# Patient Record
Sex: FEMALE | Race: OTHER | Employment: OTHER | URBAN - METROPOLITAN AREA
[De-identification: names, ages, dates, MRNs, and addresses within clinical notes are randomized per-mention and may not be internally consistent; named-entity substitution may affect disease eponyms.]

---

## 2020-12-16 ENCOUNTER — HOSPITAL ENCOUNTER (EMERGENCY)
Age: 40
Discharge: HOME | End: 2020-12-16
Attending: EMERGENCY MEDICINE
Payer: OTHER GOVERNMENT

## 2020-12-16 ENCOUNTER — APPOINTMENT (OUTPATIENT)
Dept: CT IMAGING | Age: 40
End: 2020-12-16
Attending: PHYSICIAN ASSISTANT
Payer: OTHER GOVERNMENT

## 2020-12-16 ENCOUNTER — APPOINTMENT (OUTPATIENT)
Dept: GENERAL RADIOLOGY | Age: 40
End: 2020-12-16
Attending: PHYSICIAN ASSISTANT
Payer: OTHER GOVERNMENT

## 2020-12-16 VITALS
DIASTOLIC BLOOD PRESSURE: 63 MMHG | OXYGEN SATURATION: 100 % | RESPIRATION RATE: 13 BRPM | TEMPERATURE: 98 F | HEIGHT: 66 IN | HEART RATE: 67 BPM | SYSTOLIC BLOOD PRESSURE: 128 MMHG | WEIGHT: 19 LBS | BODY MASS INDEX: 3.05 KG/M2

## 2020-12-16 DIAGNOSIS — G43.909 MIGRAINE WITHOUT STATUS MIGRAINOSUS, NOT INTRACTABLE, UNSPECIFIED MIGRAINE TYPE: Primary | ICD-10-CM

## 2020-12-16 DIAGNOSIS — M25.512 ACUTE PAIN OF LEFT SHOULDER: ICD-10-CM

## 2020-12-16 LAB
ALBUMIN SERPL-MCNC: 3.9 G/DL (ref 3.4–5)
ALBUMIN/GLOB SERPL: 1 {RATIO} (ref 0.8–1.7)
ALP SERPL-CCNC: 67 U/L (ref 45–117)
ALT SERPL-CCNC: 33 U/L (ref 13–56)
ANION GAP SERPL CALC-SCNC: 3 MMOL/L (ref 3–18)
APPEARANCE UR: CLEAR
APTT PPP: 27.6 SEC (ref 23–36.4)
AST SERPL-CCNC: 23 U/L (ref 10–38)
BASOPHILS # BLD: 0 K/UL (ref 0–0.1)
BASOPHILS NFR BLD: 0 % (ref 0–2)
BILIRUB SERPL-MCNC: 0.4 MG/DL (ref 0.2–1)
BILIRUB UR QL: NEGATIVE
BUN SERPL-MCNC: 6 MG/DL (ref 7–18)
BUN/CREAT SERPL: 7 (ref 12–20)
CALCIUM SERPL-MCNC: 9.1 MG/DL (ref 8.5–10.1)
CHLORIDE SERPL-SCNC: 106 MMOL/L (ref 100–111)
CK MB CFR SERPL CALC: 2.1 % (ref 0–4)
CK MB SERPL-MCNC: 2.7 NG/ML (ref 5–25)
CK SERPL-CCNC: 128 U/L (ref 26–192)
CO2 SERPL-SCNC: 29 MMOL/L (ref 21–32)
COLOR UR: YELLOW
CREAT SERPL-MCNC: 0.81 MG/DL (ref 0.6–1.3)
DIFFERENTIAL METHOD BLD: NORMAL
EOSINOPHIL # BLD: 0.1 K/UL (ref 0–0.4)
EOSINOPHIL NFR BLD: 2 % (ref 0–5)
ERYTHROCYTE [DISTWIDTH] IN BLOOD BY AUTOMATED COUNT: 13.3 % (ref 11.6–14.5)
GLOBULIN SER CALC-MCNC: 3.8 G/DL (ref 2–4)
GLUCOSE SERPL-MCNC: 93 MG/DL (ref 74–99)
GLUCOSE UR STRIP.AUTO-MCNC: NEGATIVE MG/DL
HCG UR QL: NEGATIVE
HCT VFR BLD AUTO: 42 % (ref 35–45)
HGB BLD-MCNC: 14.3 G/DL (ref 12–16)
HGB UR QL STRIP: NEGATIVE
INR PPP: 1 (ref 0.8–1.2)
KETONES UR QL STRIP.AUTO: ABNORMAL MG/DL
LEUKOCYTE ESTERASE UR QL STRIP.AUTO: NEGATIVE
LYMPHOCYTES # BLD: 2.9 K/UL (ref 0.9–3.6)
LYMPHOCYTES NFR BLD: 33 % (ref 21–52)
MCH RBC QN AUTO: 27.9 PG (ref 24–34)
MCHC RBC AUTO-ENTMCNC: 34 G/DL (ref 31–37)
MCV RBC AUTO: 82 FL (ref 74–97)
MONOCYTES # BLD: 0.6 K/UL (ref 0.05–1.2)
MONOCYTES NFR BLD: 7 % (ref 3–10)
NEUTS SEG # BLD: 5.1 K/UL (ref 1.8–8)
NEUTS SEG NFR BLD: 58 % (ref 40–73)
NITRITE UR QL STRIP.AUTO: NEGATIVE
PH UR STRIP: 5 [PH] (ref 5–8)
PLATELET # BLD AUTO: 295 K/UL (ref 135–420)
PMV BLD AUTO: 10.2 FL (ref 9.2–11.8)
POTASSIUM SERPL-SCNC: 3.8 MMOL/L (ref 3.5–5.5)
PROT SERPL-MCNC: 7.7 G/DL (ref 6.4–8.2)
PROT UR STRIP-MCNC: NEGATIVE MG/DL
PROTHROMBIN TIME: 13.3 SEC (ref 11.5–15.2)
RBC # BLD AUTO: 5.12 M/UL (ref 4.2–5.3)
SODIUM SERPL-SCNC: 138 MMOL/L (ref 136–145)
SP GR UR REFRACTOMETRY: 1.02 (ref 1–1.03)
TROPONIN I SERPL-MCNC: <0.02 NG/ML (ref 0–0.04)
UROBILINOGEN UR QL STRIP.AUTO: 1 EU/DL (ref 0.2–1)
WBC # BLD AUTO: 8.8 K/UL (ref 4.6–13.2)

## 2020-12-16 PROCEDURE — 85610 PROTHROMBIN TIME: CPT

## 2020-12-16 PROCEDURE — 74011000258 HC RX REV CODE- 258: Performed by: PHYSICIAN ASSISTANT

## 2020-12-16 PROCEDURE — 85025 COMPLETE CBC W/AUTO DIFF WBC: CPT

## 2020-12-16 PROCEDURE — 74011250637 HC RX REV CODE- 250/637: Performed by: PHYSICIAN ASSISTANT

## 2020-12-16 PROCEDURE — 96375 TX/PRO/DX INJ NEW DRUG ADDON: CPT

## 2020-12-16 PROCEDURE — 70450 CT HEAD/BRAIN W/O DYE: CPT

## 2020-12-16 PROCEDURE — 80053 COMPREHEN METABOLIC PANEL: CPT

## 2020-12-16 PROCEDURE — 81025 URINE PREGNANCY TEST: CPT

## 2020-12-16 PROCEDURE — 82550 ASSAY OF CK (CPK): CPT

## 2020-12-16 PROCEDURE — 85730 THROMBOPLASTIN TIME PARTIAL: CPT

## 2020-12-16 PROCEDURE — 74011250636 HC RX REV CODE- 250/636: Performed by: PHYSICIAN ASSISTANT

## 2020-12-16 PROCEDURE — 73030 X-RAY EXAM OF SHOULDER: CPT

## 2020-12-16 PROCEDURE — 81003 URINALYSIS AUTO W/O SCOPE: CPT

## 2020-12-16 PROCEDURE — 96365 THER/PROPH/DIAG IV INF INIT: CPT

## 2020-12-16 PROCEDURE — 71046 X-RAY EXAM CHEST 2 VIEWS: CPT

## 2020-12-16 PROCEDURE — 99285 EMERGENCY DEPT VISIT HI MDM: CPT

## 2020-12-16 RX ORDER — BUTALBITAL, ACETAMINOPHEN AND CAFFEINE 300; 40; 50 MG/1; MG/1; MG/1
1 CAPSULE ORAL
Qty: 10 CAP | Refills: 0 | Status: SHIPPED | OUTPATIENT
Start: 2020-12-16

## 2020-12-16 RX ORDER — IBUPROFEN 400 MG/1
800 TABLET ORAL
Status: COMPLETED | OUTPATIENT
Start: 2020-12-16 | End: 2020-12-16

## 2020-12-16 RX ORDER — DIPHENHYDRAMINE HYDROCHLORIDE 50 MG/ML
25 INJECTION, SOLUTION INTRAMUSCULAR; INTRAVENOUS
Status: COMPLETED | OUTPATIENT
Start: 2020-12-16 | End: 2020-12-16

## 2020-12-16 RX ADMIN — DIPHENHYDRAMINE HYDROCHLORIDE 25 MG: 50 INJECTION, SOLUTION INTRAMUSCULAR; INTRAVENOUS at 16:12

## 2020-12-16 RX ADMIN — IBUPROFEN 800 MG: 400 TABLET, FILM COATED ORAL at 17:38

## 2020-12-16 RX ADMIN — PROMETHAZINE HYDROCHLORIDE 25 MG: 25 INJECTION INTRAMUSCULAR; INTRAVENOUS at 16:59

## 2020-12-16 RX ADMIN — SODIUM CHLORIDE 1000 ML: 900 INJECTION, SOLUTION INTRAVENOUS at 16:12

## 2020-12-16 NOTE — ED PROVIDER NOTES
EMERGENCY DEPARTMENT HISTORY AND PHYSICAL EXAM    Date: 12/16/2020  Patient Name: Lana Reed    History of Presenting Illness     Chief Complaint   Patient presents with    Headache         History Provided By: Patient    Chief Complaint: headache       Additional History (Context):   3:46 PM  Lana Reed is a 38 y.o. female with PMHX grains, TIA last year presents to the emergency department C/O headache with blurred vision bilaterally that she woke up with this morning. Patient states this headache does feel similar to migraine she has had in the past but she has never had vision change with that. States the TIA she had last year she experienced a headache and vision change and the left eye. She is also complaining of right shoulder pain has been going on for 4 days. Initially she woke up and had some pain and numbness going down the arm 4 days ago. This only lasted a couple hours and has not returned since that time but she has had persistent pain to the shoulder. No injury. No increased activity. She denies chest pain or shortness of breath. No neck pain. PCP: Other, MD Petey        Past History     Past Medical History:  Past Medical History:   Diagnosis Date    TIA (transient ischemic attack)        Past Surgical History:  History reviewed. No pertinent surgical history. Family History:  History reviewed. No pertinent family history. Social History:  Social History     Tobacco Use    Smoking status: Never Smoker    Smokeless tobacco: Never Used   Substance Use Topics    Alcohol use: Not Currently    Drug use: Not Currently       Allergies: Allergies   Allergen Reactions    Celebrex [Celecoxib] Anaphylaxis       Review of Systems   Review of Systems   Constitutional: Negative for chills and fever. HENT: Negative for congestion, ear discharge, ear pain, facial swelling, rhinorrhea, sinus pressure, sinus pain and sore throat.     Eyes: Positive for visual disturbance (bilateral ). Negative for photophobia and pain. Respiratory: Negative for shortness of breath. Cardiovascular: Negative for chest pain. Gastrointestinal: Negative for abdominal pain, diarrhea, nausea and vomiting. Musculoskeletal: Negative for neck pain and neck stiffness. Arthralgias: left arm pain    Skin: Negative for rash. Neurological: Positive for headaches. Negative for dizziness, facial asymmetry, speech difficulty, weakness, light-headedness and numbness. All other systems reviewed and are negative. Physical Exam     Vitals:    12/16/20 1715 12/16/20 1730 12/16/20 1745 12/16/20 1913   BP: 138/76 (!) 141/81 (!) 145/78 128/63   Pulse: 60 (!) 53 (!) 57 67   Resp: 13 11 14 13   Temp:       SpO2: 100% 100% 100% 100%   Weight:       Height:         Physical Exam  Vitals signs and nursing note reviewed. Constitutional:       Appearance: She is well-developed. Comments: Alert, non toxic, NAD    HENT:      Head: Normocephalic and atraumatic. Jaw: No trismus. Right Ear: Tympanic membrane, ear canal and external ear normal. No mastoid tenderness. No hemotympanum. Tympanic membrane is not perforated, erythematous, retracted or bulging. Left Ear: Tympanic membrane, ear canal and external ear normal. No mastoid tenderness. No hemotympanum. Tympanic membrane is not perforated, erythematous, retracted or bulging. Nose: Nose normal.      Mouth/Throat:      Pharynx: Uvula midline. No oropharyngeal exudate, posterior oropharyngeal erythema or uvula swelling. Tonsils: No tonsillar abscesses. Eyes:      Extraocular Movements: Extraocular movements intact. Pupils: Pupils are equal, round, and reactive to light. Neck:      Musculoskeletal: Normal range of motion and neck supple. Normal range of motion. No neck rigidity, spinous process tenderness or muscular tenderness. Meningeal: Brudzinski's sign and Kernig's sign absent.       Comments: No meningismus   No lymphadenopathy Cardiovascular:      Rate and Rhythm: Normal rate and regular rhythm. Heart sounds: Normal heart sounds. No murmur. Pulmonary:      Effort: Pulmonary effort is normal. No respiratory distress. Breath sounds: Normal breath sounds. No wheezing or rales. Abdominal:      General: Bowel sounds are normal.      Palpations: Abdomen is soft. Tenderness: There is no abdominal tenderness. Musculoskeletal: Normal range of motion. Comments: Left shoulder: Minimal tenderness to palpation to the posterior aspect, no deformity or swelling, full range of motion, remainder of arm nontender, radial pulse 2+,  5 out of 5, sensation intact distally   Skin:     General: Skin is warm and dry. Neurological:      Mental Status: She is alert and oriented to person, place, and time. GCS: GCS eye subscore is 4. GCS verbal subscore is 5. GCS motor subscore is 6. Cranial Nerves: No cranial nerve deficit. Sensory: No sensory deficit. Motor: No tremor, atrophy or abnormal muscle tone.       Coordination: Coordination normal.      Gait: Gait normal.      Comments: No neuro deficit   No pronator drift  Normal finger nose finger  N/V intact distally   Strength 5/5 and equal in all extremities   No slurred speech   No facial droop    Psychiatric:         Judgment: Judgment normal.           Diagnostic Study Results     Labs:     Recent Results (from the past 12 hour(s))   URINALYSIS W/ RFLX MICROSCOPIC    Collection Time: 12/16/20  3:45 PM   Result Value Ref Range    Color YELLOW      Appearance CLEAR      Specific gravity 1.016 1.005 - 1.030      pH (UA) 5.0 5.0 - 8.0      Protein Negative NEG mg/dL    Glucose Negative NEG mg/dL    Ketone TRACE (A) NEG mg/dL    Bilirubin Negative NEG      Blood Negative NEG      Urobilinogen 1.0 0.2 - 1.0 EU/dL    Nitrites Negative NEG      Leukocyte Esterase Negative NEG     CBC WITH AUTOMATED DIFF    Collection Time: 12/16/20  3:55 PM   Result Value Ref Range WBC 8.8 4.6 - 13.2 K/uL    RBC 5.12 4.20 - 5.30 M/uL    HGB 14.3 12.0 - 16.0 g/dL    HCT 42.0 35.0 - 45.0 %    MCV 82.0 74.0 - 97.0 FL    MCH 27.9 24.0 - 34.0 PG    MCHC 34.0 31.0 - 37.0 g/dL    RDW 13.3 11.6 - 14.5 %    PLATELET 080 775 - 145 K/uL    MPV 10.2 9.2 - 11.8 FL    NEUTROPHILS 58 40 - 73 %    LYMPHOCYTES 33 21 - 52 %    MONOCYTES 7 3 - 10 %    EOSINOPHILS 2 0 - 5 %    BASOPHILS 0 0 - 2 %    ABS. NEUTROPHILS 5.1 1.8 - 8.0 K/UL    ABS. LYMPHOCYTES 2.9 0.9 - 3.6 K/UL    ABS. MONOCYTES 0.6 0.05 - 1.2 K/UL    ABS. EOSINOPHILS 0.1 0.0 - 0.4 K/UL    ABS. BASOPHILS 0.0 0.0 - 0.1 K/UL    DF AUTOMATED     METABOLIC PANEL, COMPREHENSIVE    Collection Time: 12/16/20  3:55 PM   Result Value Ref Range    Sodium 138 136 - 145 mmol/L    Potassium 3.8 3.5 - 5.5 mmol/L    Chloride 106 100 - 111 mmol/L    CO2 29 21 - 32 mmol/L    Anion gap 3 3.0 - 18 mmol/L    Glucose 93 74 - 99 mg/dL    BUN 6 (L) 7.0 - 18 MG/DL    Creatinine 0.81 0.6 - 1.3 MG/DL    BUN/Creatinine ratio 7 (L) 12 - 20      GFR est AA >60 >60 ml/min/1.73m2    GFR est non-AA >60 >60 ml/min/1.73m2    Calcium 9.1 8.5 - 10.1 MG/DL    Bilirubin, total 0.4 0.2 - 1.0 MG/DL    ALT (SGPT) 33 13 - 56 U/L    AST (SGOT) 23 10 - 38 U/L    Alk.  phosphatase 67 45 - 117 U/L    Protein, total 7.7 6.4 - 8.2 g/dL    Albumin 3.9 3.4 - 5.0 g/dL    Globulin 3.8 2.0 - 4.0 g/dL    A-G Ratio 1.0 0.8 - 1.7     CARDIAC PANEL,(CK, CKMB & TROPONIN)    Collection Time: 12/16/20  3:55 PM   Result Value Ref Range    CK - MB 2.7 <3.6 ng/ml    CK-MB Index 2.1 0.0 - 4.0 %     26 - 192 U/L    Troponin-I, QT <0.02 0.0 - 0.045 NG/ML   PROTHROMBIN TIME + INR    Collection Time: 12/16/20  3:55 PM   Result Value Ref Range    Prothrombin time 13.3 11.5 - 15.2 sec    INR 1.0 0.8 - 1.2     PTT    Collection Time: 12/16/20  3:55 PM   Result Value Ref Range    aPTT 27.6 23.0 - 36.4 SEC   HCG URINE, QL. - POC    Collection Time: 12/16/20  4:27 PM   Result Value Ref Range    Pregnancy test,urine (POC) Negative NEG         Radiologic Studies:   CT HEAD WO CONT   Final Result   IMPRESSION:      No CT evidence of an acute intracranial abnormality. XR SHOULDER LT AP/LAT MIN 2 V   Final Result   IMPRESSION:      No acute abnormality. XR CHEST PA LAT   Final Result   IMPRESSION:      No acute findings in the chest.        CT Results  (Last 48 hours)               12/16/20 1654  CT HEAD WO CONT Final result    Impression:  IMPRESSION:       No CT evidence of an acute intracranial abnormality. Narrative:  EXAM: CT HEAD, WITHOUT IV CONTRAST       INDICATION: Headache, vision changes       COMPARISON: None       TECHNIQUE: Multiple axial CT images of the head were obtained extending from the   skull base through the vertex. Additional coronal and sagittal reformations were   also performed. One or more dose reduction techniques were used on this CT:   automated exposure control, adjustment of the mAs and/or kVp according to   patient size, and iterative reconstruction techniques. The specific techniques   used on this CT exam have been documented in the patient's electronic medical   record. Digital Imaging and Communications in Medicine (DICOM) format image   data are available to nonaffiliated external healthcare facilities or entities   on a secure, media free, reciprocally searchable basis with patient   authorization for at least a 12-month period after this study. _______________       FINDINGS:       BRAIN AND POSTERIOR FOSSA: The sulci, folia, ventricles and basal cisterns are   within normal limits for the patient's age. There is no intracranial hemorrhage,   mass effect, or midline shift. There are no areas of abnormal parenchymal   attenuation. EXTRA-AXIAL SPACES AND MENINGES: There are no abnormal extra-axial fluid   collections. CALVARIUM: No acute osseous abnormality.        OTHER: The visualized portions of the paranasal sinuses and mastoid air cells   are clear.       _______________               CXR Results  (Last 48 hours)               12/16/20 1633  XR CHEST PA LAT Final result    Impression:  IMPRESSION:       No acute findings in the chest.       Narrative:  EXAM: XR CHEST PA LAT       CLINICAL INDICATION/HISTORY: arm pain   -Additional: None       COMPARISON: None       TECHNIQUE: PA and lateral views of the chest       _______________       FINDINGS:       HEART AND MEDIASTINUM: Cardiac size and mediastinal contours are within normal   limits       LUNGS AND PLEURAL SPACES: No focal pneumonic consolidation, pneumothorax or   pleural effusion       BONY THORAX AND SOFT TISSUES: No acute osseous abnormality       _______________                 Medical Decision Making   I am the first provider for this patient. I reviewed the vital signs, available nursing notes, past medical history, past surgical history, family history and social history. Vital Signs: Reviewed the patient's vital signs. Pulse Oximetry Analysis: 100% on   RA      Records Reviewed: Nursing Notes and Old Medical Records    Procedures:  Procedures    ED Course:   3:46 PM Initial assessment performed. The patients presenting problems have been discussed, and they are in agreement with the care plan formulated and outlined with them. I have encouraged them to ask questions as they arise throughout their visit. Patient states she has had ibuprofen in the past and does not have any adverse reaction      Recheck:   Patient is feeling much better after IV medications. States headache has resolved. Arm is feeling better. Discussion:  Pt presents with headache and some vision change. Patient does have a history of migraines she states the pain feels similar to but has never had blurred vision with it in the past.  Does have a history of TIA a year ago but states that felt differently. She has no neuro deficits, meningeal signs. No red flag signs or symptoms.   She is also complaining of some left shoulder pain has been going on for 4 days. Worse with certain movements. No tingling or numbness down the arm. No chest pain or shortness of breath. Work-up within normal limits. CT of the head shows no acute process. Patient feeling better after IV medications. Not suspect TIA CVA. More likely complex migraine. Shoulder pain is worse with certain movements and reproducible on exam.  Do not suspect cardiopulmonary . etiology. Discharged with Fioricet and have patient follow-up with her neurologist. strict return precautions given, pt offering no questions or complaints. Diagnosis and Disposition     DISCHARGE NOTE:  Manpreet Shah's  results have been reviewed with her. She has been counseled regarding her diagnosis, treatment, and plan. She verbally conveys understanding and agreement of the signs, symptoms, diagnosis, treatment and prognosis and additionally agrees to follow up as discussed. She also agrees with the care-plan and conveys that all of her questions have been answered. I have also provided discharge instructions for her that include: educational information regarding their diagnosis and treatment, and list of reasons why they would want to return to the ED prior to their follow-up appointment, should her condition change. She has been provided with education for proper emergency department utilization. CLINICAL IMPRESSION:    1. Migraine without status migrainosus, not intractable, unspecified migraine type    2. Acute pain of left shoulder        PLAN:  1. D/C Home  2. Discharge Medication List as of 12/16/2020  6:30 PM      START taking these medications    Details   butalbital-acetaminophen-caff (Fioricet) -40 mg per capsule Take 1 Cap by mouth every four (4) hours as needed for Headache., Normal, Disp-10 Cap, R-0           3.    Follow-up Information     Follow up With Specialties Details Why Contact Info    your neurologist   call Tomorrow for follow-up     [facility] EMERGENCY DEPT Emergency Medicine  If symptoms worsen [address] [place] [id number] [phone]            Please note that this dictation was completed with [employer], the computer voice recognition software. Quite often unanticipated grammatical, syntax, homophones, and other interpretive errors are inadvertently transcribed by the computer software. Please disregard these errors. Please excuse any errors that have escaped final proofreading.

## 2020-12-17 NOTE — ED NOTES
I have reviewed discharge instructions with the patient. The patient verbalized understanding.   Arm band  Placed in shred it

## 2021-03-01 ENCOUNTER — ANESTHESIA EVENT (OUTPATIENT)
Dept: EMERGENCY DEPT | Age: 41
End: 2021-03-01
Payer: OTHER GOVERNMENT

## 2021-03-01 ENCOUNTER — HOSPITAL ENCOUNTER (EMERGENCY)
Age: 41
Discharge: HOME | End: 2021-03-01
Attending: EMERGENCY MEDICINE
Payer: OTHER GOVERNMENT

## 2021-03-01 ENCOUNTER — ANESTHESIA (OUTPATIENT)
Dept: EMERGENCY DEPT | Age: 41
End: 2021-03-01
Payer: OTHER GOVERNMENT

## 2021-03-01 VITALS
TEMPERATURE: 98.6 F | DIASTOLIC BLOOD PRESSURE: 69 MMHG | OXYGEN SATURATION: 100 % | RESPIRATION RATE: 14 BRPM | WEIGHT: 173 LBS | BODY MASS INDEX: 27.8 KG/M2 | HEART RATE: 48 BPM | HEIGHT: 66 IN | SYSTOLIC BLOOD PRESSURE: 120 MMHG

## 2021-03-01 DIAGNOSIS — G97.1 POST LUMBAR PUNCTURE HEADACHE: Primary | ICD-10-CM

## 2021-03-01 LAB
ANION GAP SERPL CALC-SCNC: 6 MMOL/L (ref 3–18)
APTT PPP: 29 SEC (ref 23–36.4)
BASOPHILS # BLD: 0 K/UL (ref 0–0.1)
BASOPHILS NFR BLD: 0 % (ref 0–2)
BUN SERPL-MCNC: 10 MG/DL (ref 7–18)
BUN/CREAT SERPL: 9 (ref 12–20)
CALCIUM SERPL-MCNC: 8.9 MG/DL (ref 8.5–10.1)
CHLORIDE SERPL-SCNC: 113 MMOL/L (ref 100–111)
CO2 SERPL-SCNC: 22 MMOL/L (ref 21–32)
CREAT SERPL-MCNC: 1.13 MG/DL (ref 0.6–1.3)
DIFFERENTIAL METHOD BLD: NORMAL
EOSINOPHIL # BLD: 0.1 K/UL (ref 0–0.4)
EOSINOPHIL NFR BLD: 1 % (ref 0–5)
ERYTHROCYTE [DISTWIDTH] IN BLOOD BY AUTOMATED COUNT: 14.1 % (ref 11.6–14.5)
GLUCOSE SERPL-MCNC: 79 MG/DL (ref 74–99)
HCT VFR BLD AUTO: 39.1 % (ref 35–45)
HGB BLD-MCNC: 12.9 G/DL (ref 12–16)
INR PPP: 1 (ref 0.8–1.2)
LYMPHOCYTES # BLD: 2.2 K/UL (ref 0.9–3.6)
LYMPHOCYTES NFR BLD: 36 % (ref 21–52)
MCH RBC QN AUTO: 28 PG (ref 24–34)
MCHC RBC AUTO-ENTMCNC: 33 G/DL (ref 31–37)
MCV RBC AUTO: 84.8 FL (ref 74–97)
MONOCYTES # BLD: 0.3 K/UL (ref 0.05–1.2)
MONOCYTES NFR BLD: 5 % (ref 3–10)
NEUTS SEG # BLD: 3.6 K/UL (ref 1.8–8)
NEUTS SEG NFR BLD: 58 % (ref 40–73)
PLATELET # BLD AUTO: 295 K/UL (ref 135–420)
PMV BLD AUTO: 10.5 FL (ref 9.2–11.8)
POTASSIUM SERPL-SCNC: 4.2 MMOL/L (ref 3.5–5.5)
PROTHROMBIN TIME: 13.4 SEC (ref 11.5–15.2)
RBC # BLD AUTO: 4.61 M/UL (ref 4.2–5.3)
SODIUM SERPL-SCNC: 141 MMOL/L (ref 136–145)
WBC # BLD AUTO: 6.1 K/UL (ref 4.6–13.2)

## 2021-03-01 PROCEDURE — 96375 TX/PRO/DX INJ NEW DRUG ADDON: CPT

## 2021-03-01 PROCEDURE — 96361 HYDRATE IV INFUSION ADD-ON: CPT

## 2021-03-01 PROCEDURE — 96365 THER/PROPH/DIAG IV INF INIT: CPT

## 2021-03-01 PROCEDURE — 85730 THROMBOPLASTIN TIME PARTIAL: CPT

## 2021-03-01 PROCEDURE — 85025 COMPLETE CBC W/AUTO DIFF WBC: CPT

## 2021-03-01 PROCEDURE — 75810000124 HC INJ EPID BLD/CLOT PATCH

## 2021-03-01 PROCEDURE — 99285 EMERGENCY DEPT VISIT HI MDM: CPT

## 2021-03-01 PROCEDURE — 74011250636 HC RX REV CODE- 250/636: Performed by: EMERGENCY MEDICINE

## 2021-03-01 PROCEDURE — 85610 PROTHROMBIN TIME: CPT

## 2021-03-01 PROCEDURE — 80048 BASIC METABOLIC PNL TOTAL CA: CPT

## 2021-03-01 RX ORDER — METOCLOPRAMIDE HYDROCHLORIDE 5 MG/ML
10 INJECTION INTRAMUSCULAR; INTRAVENOUS
Status: COMPLETED | OUTPATIENT
Start: 2021-03-01 | End: 2021-03-01

## 2021-03-01 RX ORDER — DIPHENHYDRAMINE HYDROCHLORIDE 50 MG/ML
25 INJECTION, SOLUTION INTRAMUSCULAR; INTRAVENOUS ONCE
Status: COMPLETED | OUTPATIENT
Start: 2021-03-01 | End: 2021-03-01

## 2021-03-01 RX ORDER — LIDOCAINE HYDROCHLORIDE 20 MG/ML
INJECTION, SOLUTION EPIDURAL; INFILTRATION; INTRACAUDAL; PERINEURAL
Status: COMPLETED | OUTPATIENT
Start: 2021-03-01 | End: 2021-03-01

## 2021-03-01 RX ORDER — MAGNESIUM SULFATE HEPTAHYDRATE 40 MG/ML
2 INJECTION, SOLUTION INTRAVENOUS ONCE
Status: COMPLETED | OUTPATIENT
Start: 2021-03-01 | End: 2021-03-01

## 2021-03-01 RX ADMIN — SODIUM CHLORIDE 1000 ML: 900 INJECTION, SOLUTION INTRAVENOUS at 15:54

## 2021-03-01 RX ADMIN — METOCLOPRAMIDE 10 MG: 5 INJECTION, SOLUTION INTRAMUSCULAR; INTRAVENOUS at 16:25

## 2021-03-01 RX ADMIN — MAGNESIUM SULFATE IN WATER 2 G: 40 INJECTION, SOLUTION INTRAVENOUS at 16:25

## 2021-03-01 RX ADMIN — LIDOCAINE HYDROCHLORIDE 3 ML: 20 INJECTION, SOLUTION EPIDURAL; INFILTRATION; INTRACAUDAL; PERINEURAL at 18:06

## 2021-03-01 RX ADMIN — DIPHENHYDRAMINE HYDROCHLORIDE 25 MG: 50 INJECTION, SOLUTION INTRAMUSCULAR; INTRAVENOUS at 16:24

## 2021-03-01 RX ADMIN — SODIUM CHLORIDE 1000 ML: 900 INJECTION, SOLUTION INTRAVENOUS at 16:24

## 2021-03-01 NOTE — ED NOTES
Timeout performed prior to pt receiving blood patch, timeout at 1747, procedure and site confirmed, pt's name/ confirmed, staff present for procedure include this RN, [employer] CRNA, and Dr Chio Bo herman [facility]. Procedure completed @ 1459, pt tolerated well, but does c/o some back pressure, Anesthesia aware.

## 2021-03-01 NOTE — ED TRIAGE NOTES
Pt arrives to ED with c/o headache and nausea since Wednesday. Pt reports that she had a spinal tap to assist with diagnosis of MS and since has had pain. Pt reports taking advil yesterday with no relief. Pt took topamax this AM at 0700.

## 2021-03-01 NOTE — ANESTHESIA PREPROCEDURE EVALUATION
Relevant Problems   No relevant active problems       Anesthetic History   No history of anesthetic complications            Review of Systems / Medical History  Patient summary reviewed, nursing notes reviewed and pertinent labs reviewed    Pulmonary  Within defined limits                 Neuro/Psych         TIA    Comments: Pt was being worked up for [address]. Negative ct of brain and labs wnl. Cardiovascular                  Exercise tolerance: >4 METS     GI/Hepatic/Renal  Within defined limits              Endo/Other  Within defined limits           Other Findings   Comments: Positional headache, nausea and lumbar puncture with multiple prior sites in lower lumbar area midline in L4,5 tender to touch. Er doctor requested consult for probable pdph. VSS Labs wnl did not feel there is any neurological contraindication. Pt denies blood thinners. Physical Exam    Airway  Mallampati: II  TM Distance: 4 - 6 cm  Neck ROM: normal range of motion   Mouth opening: Normal     Cardiovascular               Dental         Pulmonary                 Abdominal  GI exam deferred       Other Findings            Anesthetic Plan    ASA: 2, emergent  Anesthesia type: epidural            Anesthetic plan and risks discussed with: Patient      Risks and benefits of epidural include but not limited to a headache, backache, bleeding, infection, nerve injury, paralysis, failure with need to work. .   Instructed patient to not lift anything heavy for days, stay hydrated, and remain supine for two hours from time of patch in er.

## 2021-03-01 NOTE — ED PROVIDER NOTES
EMERGENCY DEPARTMENT HISTORY AND PHYSICAL EXAM    Date: 3/1/2021  Patient Name: Loire Huddleston    History of Presenting Illness     Chief Complaint   Patient presents with    Headache    Nausea         History Provided By: Patient    3:30 PM  Lorie Huddleston is a 38 y.o. female with PMHX of migraines, currently being worked up by neurology at [facility] for possible MS status post a lumbar puncture on Wednesday February 24th who presents to the emergency department C/O headache. Patient states she is had a headache since she had a lumbar puncture. She reports it is worse when she ambulates and better when she lays down. She reports is associated with nausea. She states is severe and throughout her whole head and not similar to her migraines. She denies any fever, vision changes, chest pain, shortness of breath, bowel or urinary complaints. PCP: Petey Monroy MD    Current Outpatient Medications   Medication Sig Dispense Refill    butalbital-acetaminophen-caff (Fioricet) -40 mg per capsule Take 1 Cap by mouth every four (4) hours as needed for Headache. 10 Cap 0       Past History     Past Medical History:  Past Medical History:   Diagnosis Date    TIA (transient ischemic attack)        Past Surgical History:  History reviewed. No pertinent surgical history. Family History:  History reviewed. No pertinent family history. Social History:  Social History     Tobacco Use    Smoking status: Never Smoker    Smokeless tobacco: Never Used   Substance Use Topics    Alcohol use: Not Currently    Drug use: Not Currently       Allergies: Allergies   Allergen Reactions    Celebrex [Celecoxib] Anaphylaxis         Review of Systems   Review of Systems   Constitutional: Negative for fever. Respiratory: Negative for shortness of breath. Cardiovascular: Negative for chest pain. Gastrointestinal: Positive for nausea. Negative for abdominal pain. Neurological: Positive for headaches.    All other systems reviewed and are negative. Physical Exam     Vitals:    03/01/21 1630 03/01/21 1645 03/01/21 1745 03/01/21 1750   BP: (!) 144/84 120/71 123/76 116/77   Pulse: (!) 59 64 68 (!) 59   Resp: 15 14 20 15   Temp:   98.6 °F (37 °C)    SpO2: 100% 100% 100%    Weight:       Height:         Physical Exam    Nursing notes and vital signs reviewed    Constitutional: Non toxic appearing, moderate distress  Head: Normocephalic, Atraumatic  Eyes: EOMI  Neck: Supple, no meningeal signs  Cardiovascular: Regular rate and rhythm, no murmurs, rubs, or gallops  Chest: Normal work of breathing and chest excursion bilaterally  Lungs: Clear to ausculation bilaterally  Back: No evidence of trauma or deformity  Extremities: No evidence of trauma or deformity  Skin: Warm and dry, normal cap refill  Neuro: Alert and appropriate, CN intact, normal speech, strength and sensation full and symmetric bilaterally, normal gait, normal coordination  Psychiatric: Tearful and anxious mood and affect      Diagnostic Study Results     Labs -     Recent Results (from the past 12 hour(s))   CBC WITH AUTOMATED DIFF    Collection Time: 03/01/21  3:45 PM   Result Value Ref Range    WBC 6.1 4.6 - 13.2 K/uL    RBC 4.61 4.20 - 5.30 M/uL    HGB 12.9 12.0 - 16.0 g/dL    HCT 39.1 35.0 - 45.0 %    MCV 84.8 74.0 - 97.0 FL    MCH 28.0 24.0 - 34.0 PG    MCHC 33.0 31.0 - 37.0 g/dL    RDW 14.1 11.6 - 14.5 %    PLATELET 061 083 - 196 K/uL    MPV 10.5 9.2 - 11.8 FL    NEUTROPHILS 58 40 - 73 %    LYMPHOCYTES 36 21 - 52 %    MONOCYTES 5 3 - 10 %    EOSINOPHILS 1 0 - 5 %    BASOPHILS 0 0 - 2 %    ABS. NEUTROPHILS 3.6 1.8 - 8.0 K/UL    ABS. LYMPHOCYTES 2.2 0.9 - 3.6 K/UL    ABS. MONOCYTES 0.3 0.05 - 1.2 K/UL    ABS. EOSINOPHILS 0.1 0.0 - 0.4 K/UL    ABS.  BASOPHILS 0.0 0.0 - 0.1 K/UL    DF AUTOMATED     METABOLIC PANEL, BASIC    Collection Time: 03/01/21  3:45 PM   Result Value Ref Range    Sodium 141 136 - 145 mmol/L    Potassium 4.2 3.5 - 5.5 mmol/L Chloride 113 (H) 100 - 111 mmol/L    CO2 22 21 - 32 mmol/L    Anion gap 6 3.0 - 18 mmol/L    Glucose 79 74 - 99 mg/dL    BUN 10 7.0 - 18 MG/DL    Creatinine 1.13 0.6 - 1.3 MG/DL    BUN/Creatinine ratio 9 (L) 12 - 20      GFR est AA >60 >60 ml/min/1.73m2    GFR est non-AA 53 (L) >60 ml/min/1.73m2    Calcium 8.9 8.5 - 10.1 MG/DL   PROTHROMBIN TIME + INR    Collection Time: 03/01/21  3:45 PM   Result Value Ref Range    Prothrombin time 13.4 11.5 - 15.2 sec    INR 1.0 0.8 - 1.2     PTT    Collection Time: 03/01/21  3:45 PM   Result Value Ref Range    aPTT 29.0 23.0 - 36.4 SEC       Radiologic Studies -   No orders to display     CT Results  (Last 48 hours)    None        CXR Results  (Last 48 hours)    None          Medications given in the ED-  Medications   metoclopramide HCl (REGLAN) injection 10 mg (10 mg IntraVENous Given 3/1/21 1625)   diphenhydrAMINE (BENADRYL) injection 25 mg (25 mg IntraVENous Given 3/1/21 1624)   magnesium sulfate 2 g/50 ml IVPB (premix or compounded) (2 g IntraVENous New Bag 3/1/21 1625)   sodium chloride 0.9 % bolus infusion 1,000 mL (0 mL IntraVENous IV Completed 3/1/21 6209)     Followed by   sodium chloride 0.9 % bolus infusion 1,000 mL (1,000 mL IntraVENous New Bag 3/1/21 1624)         Medical Decision Making   I am the first provider for this patient. I reviewed the vital signs, available nursing notes, past medical history, past surgical history, family history and social history. Vital Signs-Reviewed the patient's vital signs. Pulse Oximetry Analysis - 100% on room air, not hypoxic     Cardiac Monitor:  Rate: 55 bpm  Rhythm: Sinus bradycardia    Records Reviewed: Nursing Notes    Provider Notes (Medical Decision Making): Tiffany Astorga is a 38 y.o. female seen with history and exam consistent with post lumbar puncture headache. IV headache medication and hydration initiated. Anesthesia consulted to a place of blood patch.   We will continue to observe patient for 2 hours post procedure and as long as she remains stable and feeling better anticipate discharge with primary care and neurology follow-up. Patient understands and agrees with this plan. Procedures:  Procedures    ED Course:   CONSULT NOTE:   3:41 PM  Dr. Leora Barrera spoke with Dr. Chio Bo   Specialty: Anesthesia  Discussed pt's hx, disposition, and available diagnostic and imaging results over the telephone. Reviewed care plans. Will consult on the patient regarding possible blood patch.     6:02 PM  Anesthesia has placed blood patch and recommends observing patient for 2 hours and if remains stable can be discharged at that time. 6:08 PM  Updated patient on all results and plan. All questions answered. Diagnosis and Disposition     Critical Care: None    DISCHARGE NOTE:    Sanjuanita Shah's  results have been reviewed with her. She has been counseled regarding her diagnosis, treatment, and plan. She verbally conveys understanding and agreement of the signs, symptoms, diagnosis, treatment and prognosis and additionally agrees to follow up as discussed. She also agrees with the care-plan and conveys that all of her questions have been answered. I have also provided discharge instructions for her that include: educational information regarding their diagnosis and treatment, and list of reasons why they would want to return to the ED prior to their follow-up appointment, should her condition change. She has been provided with education for proper emergency department utilization. CLINICAL IMPRESSION:    1. Post lumbar puncture headache        PLAN:  1. D/C Home  2. Current Discharge Medication List        3.    Follow-up Information     Follow up With Specialties Details Why Contact Info    Isaias Bumpers, DO Internal Medicine Schedule an appointment as soon as possible for a visit  Or Your Primary Care Doctor [address] [facility]      Lior Coffman MD Neurology Schedule an appointment as soon as possible for a visit  Or Your Neurologist [address]  [address] [id number] [phone]      [facility] EMERGENCY DEPT Emergency Medicine  If symptoms worsen [address] [place] [id number]  [phone]        _______________________________      Please note that this dictation was completed with [employer], the computer voice recognition software. Quite often unanticipated grammatical, syntax, homophones, and other interpretive errors are inadvertently transcribed by the computer software. Please disregard these errors. Please excuse any errors that have escaped final proofreading.

## 2021-03-02 NOTE — ED NOTES
Verbal shift change report given to Judith Luciano (oncoming nurse) by Jorge Kan RN (offgoing nurse). Report included the following information SBAR, Kardex, ED Summary, [facility] and Recent Results.